# Patient Record
Sex: MALE | Race: WHITE
[De-identification: names, ages, dates, MRNs, and addresses within clinical notes are randomized per-mention and may not be internally consistent; named-entity substitution may affect disease eponyms.]

---

## 2019-10-11 ENCOUNTER — HOSPITAL ENCOUNTER (OUTPATIENT)
Dept: HOSPITAL 76 - LAB.S | Age: 59
Discharge: HOME | End: 2019-10-11
Attending: NURSE PRACTITIONER
Payer: COMMERCIAL

## 2019-10-11 DIAGNOSIS — Z13.220: ICD-10-CM

## 2019-10-11 DIAGNOSIS — Z12.5: ICD-10-CM

## 2019-10-11 DIAGNOSIS — Z00.00: Primary | ICD-10-CM

## 2019-10-11 DIAGNOSIS — Z13.1: ICD-10-CM

## 2019-10-11 DIAGNOSIS — Z13.0: ICD-10-CM

## 2019-10-11 LAB
ALBUMIN DIAFP-MCNC: 4.3 G/DL (ref 3.2–5.5)
ALBUMIN/GLOB SERPL: 1.6 {RATIO} (ref 1–2.2)
ALP SERPL-CCNC: 38 IU/L (ref 42–121)
ALT SERPL W P-5'-P-CCNC: 11 IU/L (ref 10–60)
ANION GAP SERPL CALCULATED.4IONS-SCNC: 10 MMOL/L (ref 6–13)
AST SERPL W P-5'-P-CCNC: 21 IU/L (ref 10–42)
BASOPHILS NFR BLD AUTO: 0 10^3/UL (ref 0–0.1)
BASOPHILS NFR BLD AUTO: 0.2 %
BILIRUB BLD-MCNC: 1.4 MG/DL (ref 0.2–1)
BUN SERPL-MCNC: 13 MG/DL (ref 6–20)
CALCIUM UR-MCNC: 9.7 MG/DL (ref 8.5–10.3)
CHLORIDE SERPL-SCNC: 100 MMOL/L (ref 101–111)
CHOLEST SERPL-MCNC: 238 MG/DL
CO2 SERPL-SCNC: 30 MMOL/L (ref 21–32)
CREAT SERPLBLD-SCNC: 0.8 MG/DL (ref 0.6–1.2)
EOSINOPHIL # BLD AUTO: 0.1 10^3/UL (ref 0–0.7)
EOSINOPHIL NFR BLD AUTO: 1.6 %
ERYTHROCYTE [DISTWIDTH] IN BLOOD BY AUTOMATED COUNT: 13.7 % (ref 12–15)
EST. AVERAGE GLUCOSE BLD GHB EST-MCNC: 105 MG/DL (ref 70–100)
GFRSERPLBLD MDRD-ARVRAT: 100 ML/MIN/{1.73_M2} (ref 89–?)
GLOBULIN SER-MCNC: 2.7 G/DL (ref 2.1–4.2)
GLUCOSE SERPL-MCNC: 91 MG/DL (ref 70–100)
HB2 TOTAL: 15.2 G/DL
HBA1C BLD-MCNC: 0.52 G/DL
HDLC SERPL-MCNC: 94 MG/DL
HDLC SERPL: 2.5 {RATIO} (ref ?–5)
HEMOGLOBIN A1C %: 5.3 % (ref 4.6–6.2)
HGB UR QL STRIP: 14.4 G/DL (ref 14–18)
LDLC SERPL CALC-MCNC: 126 MG/DL
LDLC/HDLC SERPL: 1.3 {RATIO} (ref ?–3.6)
LYMPHOCYTES # SPEC AUTO: 2 10^3/UL (ref 1.5–3.5)
LYMPHOCYTES NFR BLD AUTO: 41.2 %
MCH RBC QN AUTO: 31 PG (ref 27–31)
MCHC RBC AUTO-ENTMCNC: 33.1 G/DL (ref 32–36)
MCV RBC AUTO: 93.5 FL (ref 80–94)
MONOCYTES # BLD AUTO: 0.4 10^3/UL (ref 0–1)
MONOCYTES NFR BLD AUTO: 8.6 %
NEUTROPHILS # BLD AUTO: 2.3 10^3/UL (ref 1.5–6.6)
NEUTROPHILS # SNV AUTO: 4.9 X10^3/UL (ref 4.8–10.8)
NEUTROPHILS NFR BLD AUTO: 48 %
PDW BLD AUTO: 9.4 FL (ref 7.4–11.4)
PLATELET # BLD: 294 10^3/UL (ref 130–450)
PROT SPEC-MCNC: 7 G/DL (ref 6.7–8.2)
RBC MAR: 4.65 10^6/UL (ref 4.7–6.1)
SODIUM SERPLBLD-SCNC: 140 MMOL/L (ref 135–145)
VLDLC SERPL-SCNC: 18 MG/DL

## 2019-10-11 PROCEDURE — 80053 COMPREHEN METABOLIC PANEL: CPT

## 2019-10-11 PROCEDURE — 83721 ASSAY OF BLOOD LIPOPROTEIN: CPT

## 2019-10-11 PROCEDURE — 84443 ASSAY THYROID STIM HORMONE: CPT

## 2019-10-11 PROCEDURE — 85025 COMPLETE CBC W/AUTO DIFF WBC: CPT

## 2019-10-11 PROCEDURE — 80061 LIPID PANEL: CPT

## 2019-10-11 PROCEDURE — 86803 HEPATITIS C AB TEST: CPT

## 2019-10-11 PROCEDURE — 36415 COLL VENOUS BLD VENIPUNCTURE: CPT

## 2019-10-11 PROCEDURE — 84153 ASSAY OF PSA TOTAL: CPT

## 2019-10-11 PROCEDURE — 83036 HEMOGLOBIN GLYCOSYLATED A1C: CPT

## 2019-10-12 LAB
HEPATITIS C ANTIBODY: (no result)
SIGNAL TO CUT-OFF: 0 (ref ?–1)

## 2020-11-27 ENCOUNTER — HOSPITAL ENCOUNTER (EMERGENCY)
Dept: HOSPITAL 76 - ED | Age: 60
Discharge: HOME | End: 2020-11-27
Payer: COMMERCIAL

## 2020-11-27 VITALS — SYSTOLIC BLOOD PRESSURE: 137 MMHG | DIASTOLIC BLOOD PRESSURE: 77 MMHG

## 2020-11-27 DIAGNOSIS — R77.8: ICD-10-CM

## 2020-11-27 DIAGNOSIS — R00.2: Primary | ICD-10-CM

## 2020-11-27 LAB
ALBUMIN DIAFP-MCNC: 4.3 G/DL (ref 3.2–5.5)
ALBUMIN/GLOB SERPL: 1.6 {RATIO} (ref 1–2.2)
ALP SERPL-CCNC: 45 IU/L (ref 42–121)
ALT SERPL W P-5'-P-CCNC: 17 IU/L (ref 10–60)
ANION GAP SERPL CALCULATED.4IONS-SCNC: 9 MMOL/L (ref 6–13)
AST SERPL W P-5'-P-CCNC: 21 IU/L (ref 10–42)
BASOPHILS NFR BLD AUTO: 0 10^3/UL (ref 0–0.1)
BASOPHILS NFR BLD AUTO: 0.3 %
BILIRUB BLD-MCNC: 0.9 MG/DL (ref 0.2–1)
BUN SERPL-MCNC: 23 MG/DL (ref 6–20)
CALCIUM UR-MCNC: 9.5 MG/DL (ref 8.5–10.3)
CHLORIDE SERPL-SCNC: 101 MMOL/L (ref 101–111)
CO2 SERPL-SCNC: 25 MMOL/L (ref 21–32)
CREAT SERPLBLD-SCNC: 1.1 MG/DL (ref 0.6–1.2)
EOSINOPHIL # BLD AUTO: 0.1 10^3/UL (ref 0–0.7)
EOSINOPHIL NFR BLD AUTO: 1.7 %
ERYTHROCYTE [DISTWIDTH] IN BLOOD BY AUTOMATED COUNT: 13.1 % (ref 12–15)
GLOBULIN SER-MCNC: 2.7 G/DL (ref 2.1–4.2)
GLUCOSE SERPL-MCNC: 158 MG/DL (ref 70–100)
HGB UR QL STRIP: 14.9 G/DL (ref 14–18)
LIPASE SERPL-CCNC: 36 U/L (ref 22–51)
LYMPHOCYTES # SPEC AUTO: 2.4 10^3/UL (ref 1.5–3.5)
LYMPHOCYTES NFR BLD AUTO: 36.5 %
MCH RBC QN AUTO: 32.4 PG (ref 27–31)
MCHC RBC AUTO-ENTMCNC: 35.6 G/DL (ref 32–36)
MCV RBC AUTO: 91.1 FL (ref 80–94)
MONOCYTES # BLD AUTO: 0.5 10^3/UL (ref 0–1)
MONOCYTES NFR BLD AUTO: 7.5 %
NEUTROPHILS # BLD AUTO: 3.6 10^3/UL (ref 1.5–6.6)
NEUTROPHILS # SNV AUTO: 6.7 X10^3/UL (ref 4.8–10.8)
NEUTROPHILS NFR BLD AUTO: 53.7 %
PDW BLD AUTO: 9.3 FL (ref 7.4–11.4)
PLATELET # BLD: 269 10^3/UL (ref 130–450)
PROT SPEC-MCNC: 7 G/DL (ref 6.7–8.2)
RBC MAR: 4.6 10^6/UL (ref 4.7–6.1)
SODIUM SERPLBLD-SCNC: 135 MMOL/L (ref 135–145)

## 2020-11-27 PROCEDURE — 99284 EMERGENCY DEPT VISIT MOD MDM: CPT

## 2020-11-27 PROCEDURE — 80053 COMPREHEN METABOLIC PANEL: CPT

## 2020-11-27 PROCEDURE — 84484 ASSAY OF TROPONIN QUANT: CPT

## 2020-11-27 PROCEDURE — 71045 X-RAY EXAM CHEST 1 VIEW: CPT

## 2020-11-27 PROCEDURE — 36415 COLL VENOUS BLD VENIPUNCTURE: CPT

## 2020-11-27 PROCEDURE — 83690 ASSAY OF LIPASE: CPT

## 2020-11-27 PROCEDURE — 85025 COMPLETE CBC W/AUTO DIFF WBC: CPT

## 2020-11-27 PROCEDURE — 93005 ELECTROCARDIOGRAM TRACING: CPT

## 2020-11-27 NOTE — ED PHYSICIAN DOCUMENTATION
PD HPI CHEST PAIN





- Stated complaint


Stated Complaint: ELEVATED HEART RATE





- Chief complaint


Chief Complaint: Cardiac





- History obtained from


History obtained from: Patient





- Additional information


Additional information: 





He has a long history of palpitations without clear diagnosis.  Today after a 

large Thanksgiving meal and drinking coffee this morning which is a little 

atypical for him he has had a sensation of painless fast heart rates today as 

well as fatigue.  No chest pain or trouble breathing.  He has had a Holter 

monitor in the past which was noncontributory.





Review of Systems


Ten Systems: 10 systems reviewed and negative


Constitutional: reports: Reviewed and negative


Nose: reports: Reviewed and negative


Throat: reports: Reviewed and negative





PD PAST MEDICAL HISTORY





- Present Medications


Home Medications: 


                                Ambulatory Orders











 Medication  Instructions  Recorded  Confirmed


 


Metoprolol Succinate [Toprol Xl] 25 mg PO DAILY #90 tab.er.24h 11/27/20 














- Allergies


Allergies/Adverse Reactions: 


                                    Allergies











Allergy/AdvReac Type Severity Reaction Status Date / Time


 


No Known Drug Allergies Allergy   Verified 11/27/20 19:06














PD ED PE NORMAL





- Vitals


Vital signs reviewed: Yes





- General


General: Alert and oriented X 3, Other (Sinus rhythm on the monitor without 

ectopy as I am examining and doing his history.)





- HEENT


HEENT: PERRL, EOMI





- Neck


Neck: Supple, no meningeal sign, No bony TTP





- Cardiac


Cardiac: RRR, No murmur





- Respiratory


Respiratory: No respiratory distress, Clear bilaterally





- Abdomen


Abdomen: Non tender





- Extremities


Extremities: No edema, No calf tenderness / cord





- Neuro


Neuro: Alert and oriented X 3, Normal speech





Results





- Vitals


Vitals: 


                               Vital Signs - 24 hr











  11/27/20 11/27/20 11/27/20





  19:06 19:59 20:09


 


Temperature 36.5 C  


 


Heart Rate 96 82 77


 


Respiratory 18 15 10 L





Rate   


 


Blood Pressure 152/83 H 140/84 H 127/80


 


O2 Saturation 100 100 99








                                     Oxygen











O2 Source                      Room air

















- EKG (time done)


  ** 1909


Rate: Rate (enter#) (90)


Rhythm: NSR


Axis: Normal


Intervals: Normal NV


QRS: Normal


Ischemia: Normal ST segments





- Labs


Labs: 


                                Laboratory Tests











  11/27/20 11/27/20 11/27/20





  19:20 19:20 19:20


 


WBC  6.7  


 


RBC  4.60 L  


 


Hgb  14.9  


 


Hct  41.9 L  


 


MCV  91.1  


 


MCH  32.4 H  


 


MCHC  35.6  


 


RDW  13.1  


 


Plt Count  269  


 


MPV  9.3  


 


Neut # (Auto)  3.6  


 


Lymph # (Auto)  2.4  


 


Mono # (Auto)  0.5  


 


Eos # (Auto)  0.1  


 


Baso # (Auto)  0.0  


 


Absolute Nucleated RBC  0.00  


 


Nucleated RBC %  0.0  


 


Sodium   135 


 


Potassium   3.3 L 


 


Chloride   101 


 


Carbon Dioxide   25 


 


Anion Gap   9.0 


 


BUN   23 H 


 


Creatinine   1.1 


 


Estimated GFR (MDRD)   69 L 


 


Glucose   158 H 


 


Calcium   9.5 


 


Total Bilirubin   0.9 


 


AST   21 


 


ALT   17 


 


Alkaline Phosphatase   45 


 


Troponin I High Sens    29.9 H*


 


Total Protein   7.0 


 


Albumin   4.3 


 


Globulin   2.7 


 


Albumin/Globulin Ratio   1.6 


 


Lipase   36 














  11/27/20





  21:29


 


WBC 


 


RBC 


 


Hgb 


 


Hct 


 


MCV 


 


MCH 


 


MCHC 


 


RDW 


 


Plt Count 


 


MPV 


 


Neut # (Auto) 


 


Lymph # (Auto) 


 


Mono # (Auto) 


 


Eos # (Auto) 


 


Baso # (Auto) 


 


Absolute Nucleated RBC 


 


Nucleated RBC % 


 


Sodium 


 


Potassium 


 


Chloride 


 


Carbon Dioxide 


 


Anion Gap 


 


BUN 


 


Creatinine 


 


Estimated GFR (MDRD) 


 


Glucose 


 


Calcium 


 


Total Bilirubin 


 


AST 


 


ALT 


 


Alkaline Phosphatase 


 


Troponin I High Sens  27.4 H*


 


Total Protein 


 


Albumin 


 


Globulin 


 


Albumin/Globulin Ratio 


 


Lipase 














PD MEDICAL DECISION MAKING





- ED course


ED course: 





59yo M with symptomatic palpitations corresponding to occasional PVCs on 

monitor. Maybe due to mild dehydration and low potassium.





That said he has a very modest elevation in his high-sensitivity troponin we 

will recheck this, in the interim he is given some potassium, IV fluids, aspirin

and metoprolol.





He did have a modestly elevated troponin but this was stable to slightly 

decreased on recheck.





Feeling better on recheck with decreased symptomatology and palpitations.





Departure





- Departure


Disposition: 01 Home, Self Care


Clinical Impression: 


 Palpitations





Condition: Good


Record reviewed to determine appropriate education?: Yes


Instructions:  ED Palpitations


Prescriptions: 


Metoprolol Succinate [Toprol Xl] 25 mg PO DAILY #90 tab.er.24h


Comments: 


Reasonable to take a beta-blocker pending follow-up with a cardiologist.  Also a

baby aspirin a day.  Return if worsening.  Call your primary care physician on 

Monday for primary cardiology referral.

## 2020-11-27 NOTE — XRAY REPORT
PROCEDURE:  Chest 1 View X-Ray

 

INDICATIONS:  Chest pain

 

TECHNIQUE:  One view of the chest was acquired.  

 

COMPARISON:  None.

 

FINDINGS:  

 

Surgical changes and devices:  None.  

 

Lungs and pleura:  No pleural effusions or pneumothorax.  Lungs are clear.  

 

Mediastinum:  Mediastinal contours appear normal.  Heart size is normal.  

 

Bones and chest wall:  No suspicious bony lesions.  Overlying soft tissues appear unremarkable.  

 

IMPRESSION:  

No acute cardiopulmonary abnormality.

 

Reviewed by: Gil King MD on 11/27/2020 7:32 PM PST

Approved by: Gil King MD on 11/27/2020 7:32 PM [facility] [[email or web address]

 

 

Station ID:  [id number]